# Patient Record
Sex: MALE | Race: OTHER | NOT HISPANIC OR LATINO | URBAN - METROPOLITAN AREA
[De-identification: names, ages, dates, MRNs, and addresses within clinical notes are randomized per-mention and may not be internally consistent; named-entity substitution may affect disease eponyms.]

---

## 2020-07-10 ENCOUNTER — EMERGENCY (EMERGENCY)
Facility: HOSPITAL | Age: 55
LOS: 1 days | Discharge: HOME | End: 2020-07-10
Attending: EMERGENCY MEDICINE | Admitting: EMERGENCY MEDICINE
Payer: OTHER MISCELLANEOUS

## 2020-07-10 VITALS
HEIGHT: 68 IN | DIASTOLIC BLOOD PRESSURE: 116 MMHG | WEIGHT: 190.04 LBS | HEART RATE: 92 BPM | TEMPERATURE: 99 F | SYSTOLIC BLOOD PRESSURE: 184 MMHG | OXYGEN SATURATION: 94 % | RESPIRATION RATE: 18 BRPM

## 2020-07-10 VITALS
HEART RATE: 69 BPM | TEMPERATURE: 98 F | OXYGEN SATURATION: 97 % | DIASTOLIC BLOOD PRESSURE: 102 MMHG | SYSTOLIC BLOOD PRESSURE: 173 MMHG | RESPIRATION RATE: 18 BRPM

## 2020-07-10 DIAGNOSIS — S20.411A ABRASION OF RIGHT BACK WALL OF THORAX, INITIAL ENCOUNTER: ICD-10-CM
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
## 2020-07-10 DIAGNOSIS — M54.6 PAIN IN THORACIC SPINE: ICD-10-CM
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
## 2020-07-10 PROCEDURE — 99284 EMERGENCY DEPT VISIT MOD MDM: CPT | Mod: 25

## 2020-07-10 PROCEDURE — 99284 EMERGENCY DEPT VISIT MOD MDM: CPT

## 2020-07-10 PROCEDURE — 71250 CT THORAX DX C-: CPT

## 2020-07-10 PROCEDURE — 71250 CT THORAX DX C-: CPT | Mod: 26

## 2020-07-10 RX ORDER — LISINOPRIL 2.5 MG/1
1 TABLET ORAL
Qty: 0 | Refills: 0 | DISCHARGE

## 2020-07-10 RX ORDER — AMLODIPINE BESYLATE 2.5 MG/1
1 TABLET ORAL
Qty: 30 | Refills: 0
Start: 2020-07-10 | End: 2020-08-08

## 2020-07-10 RX ORDER — LISINOPRIL/HYDROCHLOROTHIAZIDE 10-12.5 MG
1 TABLET ORAL
Qty: 0 | Refills: 0 | DISCHARGE

## 2020-07-10 RX ORDER — ASPIRIN/CALCIUM CARB/MAGNESIUM 324 MG
1 TABLET ORAL
Qty: 0 | Refills: 0 | DISCHARGE

## 2020-07-10 RX ORDER — IBUPROFEN 200 MG
600 TABLET ORAL ONCE
Refills: 0 | Status: COMPLETED | OUTPATIENT
Start: 2020-07-10 | End: 2020-07-10

## 2020-07-10 RX ORDER — AMLODIPINE BESYLATE 2.5 MG/1
10 TABLET ORAL ONCE
Refills: 0 | Status: COMPLETED | OUTPATIENT
Start: 2020-07-10 | End: 2020-07-10

## 2020-07-10 RX ADMIN — Medication 600 MILLIGRAM(S): at 13:22

## 2020-07-10 RX ADMIN — AMLODIPINE BESYLATE 10 MILLIGRAM(S): 2.5 TABLET ORAL at 13:25

## 2020-07-10 NOTE — ED PROVIDER NOTE - ATTENDING CONTRIBUTION TO CARE
Pt w/ PMHx HTN (noncompliant w/ meds - does not recall what he takes, ran out 2/2 pandemic), working on a step ladder (on 1st and 2nd rungs) turned and fell onto R lower ribs/ flank / back. NO head injury. No AC use. Pt w/ PMHx HTN (noncompliant w/ meds - does not recall what he takes, ran out 2/2 pandemic), working on a step ladder (on 1st and 2nd rungs) turned and fell onto R lower ribs/ flank / back. NO head injury. No AC use.  Limited PE performed in the setting of the COVID10 pandemic, in efforts to limit exposure and cross-contamination  Constitutional: Well appearing, well nourished, awake, alert, oriented to person, place, time/situation and in no apparent distress.  Head atraumatic, normocephalic  ENMT: Airway patent.   Abd soft, NT, ND, NABS. No guarding, rebound, or rigidity. No pulsatile abdominal masses. No signs of trauma  Musculoskeletal: Range of motion is not limited. + mild upper to mid thoracic midline ttp w/o stepoffs or deformities. + R posterior lower ribs ttp w/o stepoffs or deformities. no c-spine ttp. FROM neck w/o midline pain. FROM in all joints x 4 ext w/o dec ROM, pain, nor signs of trauma  Neuro: Alert and oriented x 3, face symmetric and speech fluent. Nml gross motor movement, grossly non focal   Skin: Skin normal color for race, warm, dry and intact. large abrasion R posterior lower ribs  Psych: Alert and oriented to person, place, time/situation. normal mood and affect. no apparent risk to self or others.  Fall off low-height ladder. CT neg for acute injury. Supportive care, f/u PCP. ASx HTN off meds for months. Will start Norvasc, close f/u PCP

## 2020-07-10 NOTE — ED PROVIDER NOTE - PATIENT PORTAL LINK FT
You can access the FollowMyHealth Patient Portal offered by [facility] by registering at the following website: [email or web address]. By joining [facility]’s FollowMyHealth portal, you will also be able to view your health information using other applications (apps) compatible with our system.

## 2020-07-10 NOTE — ED ADULT TRIAGE NOTE - CHIEF COMPLAINT QUOTE
53 y/o male came in to ED for evaluation of right side ribcage pain s/p fall 2 steps from a ladder. Pt reports hx of HTN and ran out of his BP medications "could not get to his doctor because of pandemic"

## 2020-07-10 NOTE — ED PROVIDER NOTE - OBJECTIVE STATEMENT
54y M with a history of hypertension non compliant with medication presents to the ED after a fall at work on a step ladder on first and second rung and fell backward landing on his back and has an abrasion to the right side of his back and complains of pain to area and right side of ribs. Denies head trauma, LOC, shortness of breath, numbness, and weakness.

## 2020-07-10 NOTE — ED PROVIDER NOTE - DISPOSITION TYPE
x2 attempts to try to contact pt  Unable to leave voicemail this time  Pt needs to see dr horne for an office visit.  
DISCHARGE

## 2020-07-10 NOTE — ED PROVIDER NOTE - MUSCULOSKELETAL, MLM
Spine appears normal, range of motion is not limited, no muscle or joint tenderness; 6cm abrasion to right thoracic area with midline tenderness and right rib tenderness, no crepitus or deformity; no cervical spine tenderness, no lumbar spine tenderness

## 2020-07-10 NOTE — ED PROVIDER NOTE - CLINICAL SUMMARY MEDICAL DECISION MAKING FREE TEXT BOX
54y M with a history of hypertension status post mechanical fall from step ladder with pain to ribs and mid back, no head trauma or LOC, no neuro deficits. Will get CT chest, given ibuprofen for pain. Patient incidentally hypertensive, asymptomatic, non compliant with medication, patient doesn't remember what he was taking, will give 10mg of Norvasc and discharge with prescription to see PMD.

## 2024-09-17 NOTE — ED PROVIDER NOTE - NS ED ATTENDING STATEMENT MOD
I have personally performed a face to face diagnostic evaluation on this patient. I have reviewed the ACP note and agree with the history, exam and plan of care, except as noted.
[id number]

## 2024-09-23 ENCOUNTER — APPOINTMENT (OUTPATIENT)
Dept: INTERNAL MEDICINE | Facility: CLINIC | Age: 59
End: 2024-09-23
Payer: COMMERCIAL

## 2024-09-23 VITALS
HEART RATE: 92 BPM | BODY MASS INDEX: 25.92 KG/M2 | RESPIRATION RATE: 16 BRPM | SYSTOLIC BLOOD PRESSURE: 120 MMHG | OXYGEN SATURATION: 99 % | HEIGHT: 69 IN | WEIGHT: 175 LBS | TEMPERATURE: 97.8 F | DIASTOLIC BLOOD PRESSURE: 67 MMHG

## 2024-09-23 DIAGNOSIS — Z71.6 TOBACCO ABUSE COUNSELING: ICD-10-CM

## 2024-09-23 DIAGNOSIS — I10 ESSENTIAL (PRIMARY) HYPERTENSION: ICD-10-CM

## 2024-09-23 DIAGNOSIS — F17.200 NICOTINE DEPENDENCE, UNSPECIFIED, UNCOMPLICATED: ICD-10-CM

## 2024-09-23 DIAGNOSIS — Z00.00 ENCOUNTER FOR GENERAL ADULT MEDICAL EXAMINATION W/OUT ABNORMAL FINDINGS: ICD-10-CM

## 2024-09-23 DIAGNOSIS — Z78.9 OTHER SPECIFIED HEALTH STATUS: ICD-10-CM

## 2024-09-23 DIAGNOSIS — Z86.39 PERSONAL HISTORY OF OTHER ENDOCRINE, NUTRITIONAL AND METABOLIC DISEASE: ICD-10-CM

## 2024-09-23 DIAGNOSIS — Z86.79 PERSONAL HISTORY OF OTHER DISEASES OF THE CIRCULATORY SYSTEM: ICD-10-CM

## 2024-09-23 DIAGNOSIS — Z84.89 FAMILY HISTORY OF OTHER SPECIFIED CONDITIONS: ICD-10-CM

## 2024-09-23 DIAGNOSIS — F17.210 NICOTINE DEPENDENCE, CIGARETTES, UNCOMPLICATED: ICD-10-CM

## 2024-09-23 DIAGNOSIS — R07.81 PLEURODYNIA: ICD-10-CM

## 2024-09-23 DIAGNOSIS — R55 SYNCOPE AND COLLAPSE: ICD-10-CM

## 2024-09-23 DIAGNOSIS — N40.0 BENIGN PROSTATIC HYPERPLASIA WITHOUT LOWER URINARY TRACT SYMPMS: ICD-10-CM

## 2024-09-23 DIAGNOSIS — E11.9 TYPE 2 DIABETES MELLITUS W/OUT COMPLICATIONS: ICD-10-CM

## 2024-09-23 DIAGNOSIS — E78.5 HYPERLIPIDEMIA, UNSPECIFIED: ICD-10-CM

## 2024-09-23 PROCEDURE — 99386 PREV VISIT NEW AGE 40-64: CPT

## 2024-09-23 RX ORDER — DICLOFENAC SODIUM 10 MG/G
1 GEL TOPICAL
Qty: 100 | Refills: 1 | Status: ACTIVE | COMMUNITY
Start: 2024-09-23 | End: 1900-01-01

## 2024-09-23 RX ORDER — LISINOPRIL 20 MG/1
20 TABLET ORAL
Qty: 90 | Refills: 0 | Status: ACTIVE | COMMUNITY
Start: 1900-01-01 | End: 1900-01-01

## 2024-09-23 RX ORDER — METFORMIN HYDROCHLORIDE 500 MG/1
500 TABLET, COATED ORAL
Refills: 0 | Status: ACTIVE | COMMUNITY

## 2024-09-23 RX ORDER — TAMSULOSIN HYDROCHLORIDE 0.4 MG/1
0.4 CAPSULE ORAL
Qty: 90 | Refills: 3 | Status: ACTIVE | COMMUNITY
Start: 1900-01-01 | End: 1900-01-01

## 2024-09-23 RX ORDER — INSULIN GLARGINE 300 [IU]/ML
300 INJECTION, SOLUTION SUBCUTANEOUS AT BEDTIME
Qty: 1 | Refills: 0 | Status: ACTIVE | COMMUNITY
Start: 2024-09-23

## 2024-09-23 NOTE — HEALTH RISK ASSESSMENT
[Fair] :  ~his/her~ mood as fair [0] : 2) Feeling down, depressed, or hopeless: Not at all (0) [Current] : Current [20 or more] : 20 or more [[id number]] : 0

## 2024-09-23 NOTE — HISTORY OF PRESENT ILLNESS
[FreeTextEntry1] : syncope follow up.  [de-identified] : 58 yrs old M with pmx of DM type II, HTN, BPH comes in to establish care.  Pt says that he was diagnosed with DM type II last year, and at that he was started on insulin glargine 10 units, as soon as he started taking it, he felt dizziness, stopped it after 4 days. Says he was in the [facility] ER on 09/13/2024 for similar dizziness, especially after getting up from lying/standing position, lost consciousness for about 30 secs, denies any prior chest pain, sob, or palpitations. Denies any seizure activity, frothing from the mouth, urine/stool incontinence, no post ictal confusion.  says they did EKG and CT head which came back normal as per pt. No records available.  says his lisinopril was decreased from 40 to 20 mg OD. eye: 2 months ago, was normal as per pt podiatry: will refer   sleep- disturbed sometimes due nocturia Mood- good appetite- good    colonoscopy- 5 yrs ago, was normal as per pt, will repeat it in 3-5 yrs  Flu- 09/2024 COVID- 2 doses Hep B MMR varicella Pneumococcal- will fax records shingrix- will fax records Tdap, Td- will faxt the records AAA- NAD Lung CA- qualify PSA- today skin cancer screening- will refer

## 2024-10-07 ENCOUNTER — NON-APPOINTMENT (OUTPATIENT)
Age: 59
End: 2024-10-07

## 2024-10-07 ENCOUNTER — APPOINTMENT (OUTPATIENT)
Dept: THORACIC SURGERY | Facility: CLINIC | Age: 59
End: 2024-10-07
Payer: COMMERCIAL

## 2024-10-07 VITALS — HEIGHT: 69 IN | BODY MASS INDEX: 25.92 KG/M2 | WEIGHT: 175 LBS

## 2024-10-07 DIAGNOSIS — F17.200 NICOTINE DEPENDENCE, UNSPECIFIED, UNCOMPLICATED: ICD-10-CM

## 2024-10-07 PROCEDURE — G0296 VISIT TO DETERM LDCT ELIG: CPT

## 2024-10-19 ENCOUNTER — APPOINTMENT (OUTPATIENT)
Dept: CT IMAGING | Facility: CLINIC | Age: 59
End: 2024-10-19